# Patient Record
Sex: FEMALE | Race: WHITE
[De-identification: names, ages, dates, MRNs, and addresses within clinical notes are randomized per-mention and may not be internally consistent; named-entity substitution may affect disease eponyms.]

---

## 2021-12-17 LAB
BASOPHILS # (AUTO): 0 K/MM3 (ref 0–0.1)
BASOPHILS NFR BLD AUTO: 0.4 % (ref 0–1.8)
BUN SERPL-MCNC: 17 MG/DL (ref 7–17)
BUN/CREAT SERPL: 34 %
CALCIUM SERPL-MCNC: 9.2 MG/DL (ref 8.4–10.2)
EOSINOPHIL # BLD AUTO: 0.1 K/MM3 (ref 0–0.4)
EOSINOPHIL NFR BLD AUTO: 1.1 % (ref 0–4.3)
HCT VFR BLD CALC: 46.5 % (ref 30.3–42.9)
HEMOLYSIS INDEX: 6
HGB BLD-MCNC: 14.8 GM/DL (ref 10.1–14.3)
LYMPHOCYTES # BLD AUTO: 2.8 K/MM3 (ref 1.2–5.4)
LYMPHOCYTES NFR BLD AUTO: 32.5 % (ref 13.4–35)
MCHC RBC AUTO-ENTMCNC: 32 % (ref 30–34)
MCV RBC AUTO: 86 FL (ref 79–97)
MONOCYTES # (AUTO): 0.6 K/MM3 (ref 0–0.8)
MONOCYTES % (AUTO): 6.7 % (ref 0–7.3)
PLATELET # BLD: 264 K/MM3 (ref 140–440)
RBC # BLD AUTO: 5.43 M/MM3 (ref 3.65–5.03)

## 2021-12-22 NOTE — HISTORY AND PHYSICAL REPORT
History of Present Illness


Date of examination: 21


Chief complaint: 





Menometrorrhagia, fibroids, simple hyperplasia, breast cancer


History of present illness: 


Menstrual History: 


LMP (date): 2021


Current Method of Contraception:  None








Past Pregnancy History 


   :      0








GYN History 


Operations: Breast Biopsy:  (2021)(excision) LEFT BREAST EXCISIONAL BIOPSY


Left Axillary [place] Lymph Node Biopsy (2021) Negative


Abnormal PAP: negative


Uterine Anomaly: positive


 fibroids





Infection History 


HIV Risk Eval: no


Hx of STD: None





Active Medications (reviewed today):


hydrochlorothiazide 12.5 mg capsule (hydrochlorothiazide) 


losartan unspecified unspecified (losartan) 


Vitamin D3 unspecified unspecified (cholecalciferol (vitamin d3)) 





Current Allergies (reviewed today):


CIPRO (Critical)


IBUPROFEN (Critical)


CODEINE (Critical)


LATEX GLOVES (DISPOSABLE GLOVES) (Critical)


ACETAMINOPHEN (ACETAMINOPHEN) (Critical)








Past Medical History:


   Reviewed and updated today:


      PreDiabetes


      Hypertension


      Breast Cancer () BRCA NEGATIVE


      BRCA negative


      Psoriasis





Past Surgical History:


   Reviewed and updated today:


      Breast Biopsy:  (2021)(excision) LEFT BREAST EXCISIONAL BIOPSY


      Left Axillary [place] Lymph Node Biopsy (2021) Negative











Family History Summary: 


   Reviewed history Last on 2021 and no changes required:2021


Other Family Member - Has No Family History of Uterine Cancer - Entered On: 


Other Family Member - Has No Family History of Small Bowel Cancer - Entered On: 

2021


Other Family Member - Has No Family History of Stomach Cancer - Entered On: 

2021


Other Family Member - Has No Family History of Pancreatic Cancer - Entered On: 

2021


Other Family Member - Has No Family History of Ovarvian Cancer - Entered On: 

2021


Other Family Member - Has No Family History of Kidney/Urinary Tract Cancer - 

Entered On: 2021


Other Family Member - Has No Family History of Spontaneous DVT-PE - Entered On: 

2021


Other Family Member - Has No Family History of Colon Cancer - Entered On: 

2021


Other Family Member - Has No Family History of Brain Cancer - Entered On: 

2021


Other Family Member - Has No Family History of Breast Cancer - Entered On: 

2021


Other Family Member - Has No Family History of Biliary Tract Cancer - Entered 

On: 2021








Social History:


   Reviewed history from 2021 and no changes required:


      Patient is 


      


      Smoking History:


      Patient has never smoked.








Risk Factors: 


Smoked Tobacco Use:  Never smoker


Smokeless Tobacco Use:  Never


Passive Smoke Exposure:  no


HIV High Risk Behavior:  no


Exercise:  yes


   Times/wk:  7


   Type of Exercise:  walking


Seatbelt Use:  100 %





PAP Smear History: 


   Date of Last PAP Smear:  2021


   Results:  Normal 





Alcohol Use:  no





Drug Use:  no





Previous Tobacco Use: Signed On - 2021


Smoked Tobacco Use:  Never smoker


Smokeless Tobacco Use:  Never


Passive Smoke Exposure:  no


HIV High Risk Behavior:  no


Exercise:  yes


   Times/wk:  3


   Type of Exercise:  waslking


Seatbelt Use:  100 %


Alcohol Use:  no





Drug Use:  no














Physical Exam 


Appearance: well developed, well nourished, no acute distress





Other Exams 


Lungs: no rales, rhonchi, or wheezes


Heart: S1, S2, no murmur, rub, or gallop





Genitourinary Exam 


Uterus: deferred tp EUA








Impression & Recommendations:





Problem # 1:  Fibroids of  uterus; Intramural (ICD-218.1) (KZQ27-O92.1)


Diagnosis explained to patient . Discussed with patient various medical, 

surgical and radiological therapies common for treatment including, but not 

limited to, myomectomy,  hysterectomy and uterine artery embolization.  

Discussed risks and benefits of laparotomy, laparoscopy, vaginal and robotic 

assisted approaches for hysterectomies. Patient desires definitive treatment in 

the form of robot assisted laparoscopic total hysterectomy.    The risks and 

alternatives for this surgery were reviewed with the patient.  She was informed 

of  the risks of the surgery including, but not limited to, pain, infection, 

bleeding possibly heavy enough to require a blood transfusion with associated 

risks of infections (hepatitis and HIV) and transfusion reactions, possible 

damage to bowel, bladder or ureter(s) and surrounding organs. She was also 

informed of slight increased risk for vaginal cuff  breakdown with the robotic 

approach. Patient understands that this surgery will  make her sterile. 

Indications to abort a robotic/laparoscopic procedure and perform an open 

procedure were explained. Patient understands if her ovaries are removed she 

will become menopausal. Patient advised the small risks of spreading of 

malignancy if morcellation  is required during the surgery patient understands 

and approves performing if necessary. Questions answered. Consent reviewed and 

signed


The patient was instructed/informed the following:


   The normal length of hospital stay for this procedure.


   Nothing to eat or drink after midnight the evening prior to surgery..  


Pre-op instruction sheets given. 


Wound care instructions given.





Problem # 2:  Simple endometrial glandular hyperplasia without atypia (ICD10-

N85.01)








Problem # 3:  Menometrorrhagia (ICD-626.2) (OER03-R35.1)











Problem # 4:  Breast cancer (ICD-174.9) (WBA96-B20.919)


 According to the patient and the note by Dr. Cole (Rad Onc) she will  have 

radiation therapy after she heals from the hysterectomy. She was informed I 

recommend postponing radiation for now to ensure optimal healing of the vaginal 

cuff or other incision(s) however will further discuss after the procedure.


Although she has not had her appt with Dr. Hinson (Med Onc), she states Dr. Wise 

(Breast surgeon)recommends keeping he ovaries at this time. She desires ovarian 

conservation. She was informed she may require surgery later to have her ovaries

removed for a benign or malignant condition.








Problem # 5:  Hypertension (ICD-401.1) ([id number])





Her updated medication list for this problem includes:


   Hydrochlorothiazide 12.5 Mg Capsule (Hydrochlorothiazide)


   Losartan Unspecified Unspecified (Losartan)








Problem # 6:  Prediabetes (AET40-B86.03)


According to her medical clearance 2021, her hgbA1c was 6.7 and she was not 

taking Metformin that was prescribe. 











Medications and Allergies


                                    Allergies











Allergy/AdvReac Type Severity Reaction Status Date / Time


 


ciprofloxacin [From Cipro] Allergy Intermediate Rash Verified 21 11:24


 


latex Allergy Intermediate Rash Verified 21 11:24


 


acetaminophen [From Tylenol] Allergy  Itching Verified 21 12:26


 


ibuprofen 800 mg Allergy Intermediate Rash Uncoded 21 11:24











                                Home Medications











 Medication  Instructions  Recorded  Confirmed  Last Taken  Type


 


Ergocalciferol (Vitamin D2) 50,000 unit PO 1XW 21 Unknown History





[Vitamin D2]     


 


Losartan [Cozaar] 50 mg PO QDAY 21 Unknown History


 


hydroCHLOROthiazide [HCTZ] 25 mg PO PRN 21 Unknown History











Active Meds: 


Active Medications





Celecoxib (Celecoxib 200 Mg Cap)  400 mg PO PREOP NR


   Stop: 21 23:01


Diphenhydramine HCl (Diphenhydramine 50 Mg Cap)  25 mg PO ONCE NR


   Stop: 21 23:01


Fentanyl (Fentanyl 100 Mcg/2 Ml Inj)  100 mcg IV ONCE ONE


   Stop: 21 06:01


Hydrochlorothiazide (Hydrochlorothiazide 25 Mg Tab)  25 mg PO PRN JOSE


Lactated Ringer's (Lactated Ringers)  1,000 mls @ 125 mls/hr IV AS DIRECT JOSE


Cefazolin Sodium (Ancef/Sterile Water 2 Gm/20 Ml)  2 gm in 20 mls @ 80 mls/hr IV

 PREOP NR; Protocol


   Stop: 21 23:59


Losartan Potassium (Losartan 50 Mg Tab)  50 mg PO QDAY JOSE


Magnesium Oxide (Magnesium Oxide 400 Mg Tab)  400 mg PO ONCE ONE


   Stop: 21 06:01


Methocarbamol (Methocarbamol 750 Mg Tab)  1,500 mg PO ONCE JOSE


   Stop: 21 23:01


Midazolam HCl (Midazolam 2 Mg/2 Ml Inj)  2 mg IV PREOP NR


   Stop: 21 23:59











Exam


                                   Vital Signs











Temp Pulse Resp BP Pulse Ox


 


 98.4 F   84   20   139/85   98 


 


 21 10:30  21 10:30  21 10:30  21 10:30  21 10:30














Results





- Labs





                                 21 06:00





                                 21 06:00





Assessment and Plan





- Patient Problems


(1) Fibroids


Status: Chronic   





(2) Menometrorrhagia


Status: Chronic   





(3) Simple endometrial hyperplasia without atypia


Status: Acute   





(4) Breast cancer


Status: Acute   





(5) Hypertension


Status: Chronic   





(6) Pre-diabetes


Status: Chronic

## 2021-12-23 ENCOUNTER — HOSPITAL ENCOUNTER (OUTPATIENT)
Dept: HOSPITAL 5 - OR | Age: 41
Setting detail: OBSERVATION
LOS: 1 days | Discharge: HOME | End: 2021-12-24
Attending: OBSTETRICS & GYNECOLOGY | Admitting: OBSTETRICS & GYNECOLOGY
Payer: COMMERCIAL

## 2021-12-23 DIAGNOSIS — N85.01: ICD-10-CM

## 2021-12-23 DIAGNOSIS — Z20.822: ICD-10-CM

## 2021-12-23 DIAGNOSIS — D21.9: ICD-10-CM

## 2021-12-23 DIAGNOSIS — Z79.899: ICD-10-CM

## 2021-12-23 DIAGNOSIS — I10: ICD-10-CM

## 2021-12-23 DIAGNOSIS — Z98.890: ICD-10-CM

## 2021-12-23 DIAGNOSIS — N92.1: Primary | ICD-10-CM

## 2021-12-23 DIAGNOSIS — E11.9: ICD-10-CM

## 2021-12-23 PROCEDURE — 96366 THER/PROPH/DIAG IV INF ADDON: CPT

## 2021-12-23 PROCEDURE — U0003 INFECTIOUS AGENT DETECTION BY NUCLEIC ACID (DNA OR RNA); SEVERE ACUTE RESPIRATORY SYNDROME CORONAVIRUS 2 (SARS-COV-2) (CORONAVIRUS DISEASE [COVID-19]), AMPLIFIED PROBE TECHNIQUE, MAKING USE OF HIGH THROUGHPUT TECHNOLOGIES AS DESCRIBED BY CMS-2020-01-R: HCPCS

## 2021-12-23 PROCEDURE — 81025 URINE PREGNANCY TEST: CPT

## 2021-12-23 PROCEDURE — 88307 TISSUE EXAM BY PATHOLOGIST: CPT

## 2021-12-23 PROCEDURE — 96365 THER/PROPH/DIAG IV INF INIT: CPT

## 2021-12-23 PROCEDURE — 88302 TISSUE EXAM BY PATHOLOGIST: CPT

## 2021-12-23 PROCEDURE — 83036 HEMOGLOBIN GLYCOSYLATED A1C: CPT

## 2021-12-23 PROCEDURE — 93005 ELECTROCARDIOGRAM TRACING: CPT

## 2021-12-23 PROCEDURE — 85025 COMPLETE CBC W/AUTO DIFF WBC: CPT

## 2021-12-23 PROCEDURE — 86901 BLOOD TYPING SEROLOGIC RH(D): CPT

## 2021-12-23 PROCEDURE — 86900 BLOOD TYPING SEROLOGIC ABO: CPT

## 2021-12-23 PROCEDURE — 36415 COLL VENOUS BLD VENIPUNCTURE: CPT

## 2021-12-23 PROCEDURE — 85018 HEMOGLOBIN: CPT

## 2021-12-23 PROCEDURE — 80048 BASIC METABOLIC PNL TOTAL CA: CPT

## 2021-12-23 PROCEDURE — 86850 RBC ANTIBODY SCREEN: CPT

## 2021-12-23 PROCEDURE — 58554 LAPARO-VAG HYST W/T/O COMPL: CPT

## 2021-12-23 PROCEDURE — 85014 HEMATOCRIT: CPT

## 2021-12-23 PROCEDURE — 64450 NJX AA&/STRD OTHER PN/BRANCH: CPT

## 2021-12-23 PROCEDURE — G0378 HOSPITAL OBSERVATION PER HR: HCPCS

## 2021-12-23 RX ADMIN — CEFAZOLIN SCH MLS/HR: 330 INJECTION, POWDER, FOR SOLUTION INTRAMUSCULAR; INTRAVENOUS at 17:25

## 2021-12-23 NOTE — OPERATIVE REPORT
Operative Report


Operative Report: 


Date:      12/23/2021





Preoperative diagnosis:   1.  Menometrorrhagia


         2.  Simple hyperplasia without atypia


         3.  Body mass index of 36.6 kg/m


         4.  Uterine fibroid


         5.  Breast cancer


         6.  Hypertension


         7.  Prediabetes


         


Postoperative diagnosis:   1.  Menometrorrhagia


         2.  Simple hyperplasia without atypia


         3.  Body mass index of 36.6 kg/m


         4.  Uterine fibroid


         5.  Breast cancer


         6.  Hypertension


         7.  Prediabetes





Procedure:      1.  Robotic-assisted laparoscopic total hysterectomy with bila

teral 


         salpingectomy


         


Surgeon:      Rosy Liu MD





Assistant:         Cheri ARCE





Anesthesiologist:   Dr. Singh





Anesthesia:      General endotracheal anesthesia





EBL:       Approximately 100 mL 





Findings:       EUA: Uterus palpated to approximately 15-16 weeks.    Uterus was

sounded to 11 cm.  Grossly normal tubes and ovaries.  Large posterior fundal 

approximately 10 cm fibroid, enlarged uterus, 3 cm  right lower lateral fibroid.





Procedure:      Patient was taken to the OR and placed in the supine position.  

General anesthesia was induced and an oral gastric tube was placed.  Her neck 

and head were placed on foam support.  Foam eye protection with goggles were 

secured in place.  Then foam face protection was placed and secured.  Foam 

shoulder pads were then positioned on her shoulders for Trendelenburg 

positioning.  She was then placed in dorsolithotomy position.  Exam under 

anesthesia as above.  The abdomen and vagina were then prepped and draped in the

usual sterile fashion.  Timeout was performed.  A Marley catheter was inserted 

into the bladder with drainage of clear yellow urine.  The operative speculum 

was introduced into the vagina and the anterior lip of the cervix was grasped 

with single-toothed tenaculum.  The uterus was sounded to 11 cm.  The cervix was

progressively dilated to allow the large V care uterine manipulator.  The bulb 

of the manipulator was inflated and the speculum and tenaculum were removed.  

The cup of the manipulator was placed around the cervix and the blue occluder of

the manipulator was properly positioned in the vagina and secured.  A laparotomy

sponge that was saturated with a solution of polymyxin and saline was placed in 

the vagina to ensure pneumoperitoneum.  Sterile gloves were placed and attention

was turned to the abdomen.





A 10 mm midline vertical supraumbilical incision was made approximately 10 cm 

superior to the elevated fundus of the uterus.  A 10-12 mm trocar with the 

laparoscope and camera attached was introduced through this incision under 

direct visualization.  The abdomen was insufflated.  No obvious bowel, bladder, 

ureteral, or major vascular injury was noted.  The patient was then placed in 

steep Trendelenburg position and the following trochars were placed under direct

visualization: 8 mm robotic trochars were placed through incisions made in the 

bilateral midclavicular lower abdominal region approximately 10 cm lateral to 

the midline incision, and a 5 mm trocar was placed through an incision made in 

the right lower lateral pelvis.  The 10 mm laparoscope was then replaced by a 5 

mm laparoscope that was placed through the 5 millimeter lateral trocar.  The 12 

mm trocar was then removed and the Greg Esquivel fascial closure device was 

placed through the incision and a 0 Vicryl was placed through the fascia.  Once 

the suture was secured the 12 mm trocar was reintroduced.  Once the trochars 

were in the appropriate positions,  the  da Clif robot system was engaged.  The

vessel sealer and bipolar device were placed through the 8 mm trochars and 

positioned then attention was turned to the console.  The uterus was elevated 

and bilateral salpingectomy was performed.  Each tube was removed through the 5 

mm trocar and sent to pathology in separate containers.  Then the utero-ovarian 

ligaments were clamped, cauterized and incised bilaterally using 30 W of energy.

 Then the round ligaments were clamped, cauterized and incised bilaterally.    

The anterior leaf of the broad ligament was elevated and with careful blunt and 

sharp dissection the bladder flap was created and dissected away from the lower 

uterine segment and cervix.  The posterior leaf of the broad ligament was 

dissected away from the uterine vessels.   The right fibroid was removed in 

order to restore appropriate anatomy.  Once the fibroids were removed the 

uterine vessels were able to better be visualized and skeletonized . The cup of 

the uterine manipulator was palpated both anteriorly and posteriorly.  The 

bladder was further dissected away from the lower uterine segment.  The uterine 

vessels were then clamped and cauterized bilaterally.  Blanching of the uterus 

was then noted.  Attention was again turned to the anterior lower uterine 

segment and the bladder was confirmed to be away from the operative field.  Then

attention was turned again to the posterior where the cup of the manipulator was

palpated. The vessel sealer was removed and the EndoShears were placed and a 

colpotomy was performed down to the cup.  The incision was extended in the 

lateral position to the uterine vessels that were again clamped and cauterized 

and incised.  Continuing along the cup of the manipulator in a circumferential 

manner the colpotomy was completed.  The fibroid that was previously removed was

placed in the vagina for removal.  The uterus appeared to be too large to be 

delivered through the vaginal incision in the head therefore the large posterior

fundal fibroid was removed and bivalved and then removed through the vagina.  

The uterus and cervix were then removed through the vaginal incision.    The 

pelvis was irrigated with warm normal saline.  A moist laparotomy sponge was 

placed in the vagina to maintain pneumoperitoneum.  The vagina cuff was 

approximated using V  suture from the right to left and then reinforced 

in the opposite direction with the same V LOC suture.  Then a J stitch was 

performed to secure the suture.  Again the pelvis was copiously irrigated with 

polymixin in warm normal saline.  The laparotomy sponge was removed from the 

vagina.  No obvious evidence of bowel, bladder, ureteral, or major vascular 

injury was noted. 








Once hemostasis was noted, Surgicel powder was applied to the operative field to

ensure hemostasis.  Approximately 30 seconds after the Surgicel powder was plac

ed, the pelvis was irrigated to remove the excess powder. 





Then the instruments were removed, the robot was disengaged.  The 12 mm trocar 

was removed and the fascia  was ligated with the 0 Vicryl suture that was placed

at the beginning of the procedure.  The patient was taken out of Trendelenburg 

position, the abdomen was desufflated,  the remaining trochars were removed.  

Incisions were reapproximated using 4-0 Monocryl in a subcuticular manner.    

Surgiseal was placed over the other incisions.  The vagina was then inspected, 

the cuff was palpated to be intact, there is a small superficial laceration on 

the left introitus that was approximated for hemostasis with 3-0 Vicryl in a 

figure-of-eight fashion. Clear yellow urine was draining into the Marley bag from

the bladder at the end of the procedure.  Counts were correct 3. Marley catheter

was removed. Patient was taken to recovery room in stable condition.

## 2021-12-24 VITALS — SYSTOLIC BLOOD PRESSURE: 128 MMHG | DIASTOLIC BLOOD PRESSURE: 77 MMHG

## 2021-12-24 LAB
HCT VFR BLD CALC: 40.1 % (ref 30.3–42.9)
HGB BLD-MCNC: 12.9 GM/DL (ref 10.1–14.3)

## 2021-12-24 RX ADMIN — CEFAZOLIN SCH MLS/HR: 330 INJECTION, POWDER, FOR SOLUTION INTRAMUSCULAR; INTRAVENOUS at 00:35

## 2021-12-24 RX ADMIN — GABAPENTIN SCH MG: 300 CAPSULE ORAL at 20:51

## 2021-12-24 RX ADMIN — GABAPENTIN SCH MG: 300 CAPSULE ORAL at 06:41

## 2021-12-24 RX ADMIN — CEFAZOLIN SCH MLS/HR: 330 INJECTION, POWDER, FOR SOLUTION INTRAMUSCULAR; INTRAVENOUS at 01:10

## 2021-12-24 NOTE — EVENT NOTE
Date: 12/23/21





Patient resting in bed, states she was nauseous earlier but none now.Operative 

findings and procedure explained. Informed she was rather lethargic in recovery 

and her BP's were elevated therefore the decision was made to observe her 

overnight. She will be reassessed in the am and ? allowed home. Questions 

encouraged and answered, she voiced understanding and agrees with plan of care.

## 2021-12-24 NOTE — PROGRESS NOTE
Assessment and Plan





- Patient Problems


(1) History of robot-assisted laparoscopic hysterectomy


Current Visit: Yes   Status: Acute   


Plan to address problem: 


POD#1 Doing well, no complaints. Labs reviewed. Tachycardia noted uncertain 

etiology. She denies pain however given Gabapentin. She denies SOB and CP. Will 

observe for now. Allow home if tachycardia resolves. Post-op instrns given to 

patient. She voiced understanding and agrees with POC.








(2) Fibroids


Current Visit: No   Status: Resolved   





(3) Menometrorrhagia


Current Visit: No   Status: Resolved   





(4) Simple endometrial hyperplasia without atypia


Current Visit: No   Status: Resolved   





(5) Breast cancer


Current Visit: No   Status: Acute   





(6) Hypertension


Current Visit: No   Status: Chronic   





(7) Pre-diabetes


Current Visit: No   Status: Chronic   





(8) Tachycardia


Current Visit: Yes   Status: Acute   





Subjective





- Subjective


Date of service: 12/24/21


Principal diagnosis: POD#1 RALTH


Interval history: 





Resting in bed, no complaints. 


Patient reports: appetite normal, voiding normally (Patient actually took 

pictures of each time she voided. Appears to have voided ~every hour ~300-400mL 

each time clear yellow), ambulating normally (No gonzalez overnight, she was able 

to ambulate to and from the bathroom w/o difficulty or assistance)





Objective





- Vital Signs


Latest vital signs: 


                                   Vital Signs











  Temp Pulse Resp BP BP Pulse Ox


 


 12/24/21 04:30  98.6 F  110 H  18   138/61  99


 


 12/24/21 00:33  98.5 F  110 H  18  135/86   97


 


 12/23/21 20:35  98.2 F  107 H  18   130/83  95


 


 12/23/21 19:50       96


 


 12/23/21 17:40  97.6 F  103 H  18   148/87  96


 


 12/23/21 16:20  98.6 F  98 H  16   145/94  96


 


 12/23/21 16:00   94 H  14  140/90   99


 


 12/23/21 15:45   90  16  157/93   100


 


 12/23/21 15:30   89  15  159/100   99


 


 12/23/21 15:15  97.6 F  93 H  13  160/98   99


 


 12/23/21 15:00   91 H  12  167/101   100


 


 12/23/21 14:55   89  11 L  157/98   100


 


 12/23/21 14:50   86  11 L  162/101   100


 


 12/23/21 14:46  97.3 F L  105 H  14  185/105   96


 


 12/23/21 09:40   83  14  158/98   100


 


 12/23/21 09:30   82  16  143/92   100


 


 12/23/21 09:24    14   


 


 12/23/21 09:20   86  13  142/95   100


 


 12/23/21 09:06   82  13  133/94   99


 


 12/23/21 09:01   80  12  139/91   98


 


 12/23/21 08:56   80  12  140/96   98


 


 12/23/21 08:51   84  14  130/97   100


 


 12/23/21 08:43   81  13  133/84   100


 


 12/23/21 08:15    16   


 


 12/23/21 07:35  97.7 F  86  16  151/89   98








                                Intake and Output











 12/23/21 12/24/21 12/24/21





 22:59 06:59 14:59


 


Intake Total 2770 245 


 


Output Total 225 400 


 


Balance 2545 -155 


 


Intake:   


 


  IV 2650 5 


 


    ANCEF/NS 1 GM/50 ML 1 gm 50 5 





    In 50 ml @ 100 mls/hr IV   





    Q8H [facility] Rx#:[id number]   


 


  Oral  240 


 


  Intake, Free Water 120  


 


Output:   


 


  Urine 225 400 


 


    Void 225 400 


 


Other:   


 


  Total, Intake Amount  240 


 


  Total, Output Amount 150 400 














- Exam


Breasts: Present: deferred


Cardiovascular: Present: Other (tachycardia ~100)


Lungs: Present: Clear to auscultation, Normal air movement


Abdomen: Present: normal appearance, soft, normal bowel sounds.  Absent: 

distention, tenderness


Extremities: Present: normal.  Absent: tenderness, edema


Incision: Present: normal, dry, intact (C/D/I)





- Labs


Labs: 


                              Abnormal lab results











  12/24/21 Range/Units





  04:41 


 


Hemoglobin A1c  6.7 H  (4-6)  %

## 2021-12-24 NOTE — DISCHARGE SUMMARY
Providers





- Providers


Date of Admission: 


12/23/21 15:33





Date of discharge: 12/24/21


Attending physician: 


AMEENA TAPIA





                                        





12/24/21 15:36


Consult to Physician [CONS] Routine 


   Comment: 


   Consulting Provider: KENRICK ROMANO


   Physician Instructions: 


   Reason For Exam: Abnomal EKG, post op











Primary care physician: 


MIGUEL DE LEON MD








Hospitalization


Condition: Good


Procedures: 





Robotic assisted laparoscopic total hysterectomy with bilateral salpingectomy


Hospital course: 





Ms. Hall was admitted for robotic assisted laparoscopic total hysterectomy 

with bilateral salpingectomy.  Her procedure was uncomplicated however she was 

slow with waking up and her pressures were elevated in recovery therefore the 

decision was made to admit her overnight and observe.  During the night it was 

noted patient's heart rate slowly increased in spite of the fact that she was 

voiding without difficulty and copious clear yellow urine.  Her hemoglobin was 

12.9.  There is no evidence of bleeding.  Due to the persistent mild tachycardia

 EKG was performed that was abnormal.  Dr. Anil Brenner was consulted, she re

viewed the EKG and verbally stated that the patient did not require any further 

evaluation and could be discharged home.  I spoke to Ms. Hall by phone, 

questions were encouraged and answered and the plan of care was explained.  She 

voiced understanding desires discharge home tonight.  Discharge instructions 

were reviewed with her earlier today.  Patient will be allowed home at this 

time.


Disposition: 01 HOME / SELF CARE / HOMELESS


Final Discharge Diagnosis (Prints w/discharge instructions): Robotic assisted 

laparoscopic total hysterectomy with bilateral salpingectomy.





- Discharge Diagnoses


(1) History of robot-assisted laparoscopic hysterectomy


Status: Acute   





(2) Fibroids


Status: Resolved   





(3) Menometrorrhagia


Status: Resolved   





(4) Simple endometrial hyperplasia without atypia


Status: Resolved   





(5) Breast cancer


Status: Acute   





(6) Hypertension


Status: Chronic   





(7) Pre-diabetes


Status: Chronic   





(8) Tachycardia


Status: Acute   





Core Measure Documentation





- Palliative Care


Palliative Care/ Comfort Measures: Not Applicable





- Core Measures


Any of the following diagnoses?: none





Exam





- Physical Exam


Narrative exam: 


See post op note for physical exam.





- Constitutional


Vitals: 


                                        











Temp Pulse Resp BP Pulse Ox


 


 97.7 F   98 H  16   130/79   98 


 


 12/24/21 16:15  12/24/21 16:15  12/24/21 16:15  12/24/21 16:15  12/24/21 16:15














Plan


Activity: other (No sex, no driving.  Ambulate approximately 1 mile on her 

property a day.  Use your incentive spirometer every hour while awake.  Void 

every hour while awake.)


Weight Bearing Status: Weight Bear as Tolerated


Diet: low fat, low cholesterol, low salt, diabetic (Eat small meals frequently. 

 Avoid spicy foods.  Drink approximately 80 ounces of water a day.)


Wound: open to air, keep clean and dry


Special Instructions: no heavy lifting (Greater than 15 pounds.)


Follow up with: 


MIGUEL DE LEON MD [Primary Care Provider] - 3 Days


AMEENA TAPIA MD [Staff Physician] -  (As scheduled)


DEACON BRENNER MD [Staff Physician] - 3 Days


Prescriptions: 


Gabapentin 300 mg PO Q8HR PRN #15 capsule


 PRN Reason: Pain , Severe (7-10)

## 2021-12-27 NOTE — ELECTROCARDIOGRAPH REPORT
[facility]

                                       

Test Date:    2021               Test Time:    15:18:45

Pat Name:     KAREN LEARY             Department:   

Patient ID:   [id number]          Room:         2104 1

Gender:       F                        Technician:   GRACE

:          1980               Requested By: AMEENA TAPIA

Order Number: [id number]              Reading MD:   Dexter Carver

                                 Measurements

Intervals                              Axis          

Rate:         101                      P:            -45

MD:           186                      QRS:          48

QRSD:         83                       T:            3

QT:           359                                    

QTc:          466                                    

                           Interpretive Statements

Very poor quality ECG

SINUS TACHYCARDIA

No previous ECG available for comparison

Electronically Signed On 2021 10:41:33 EST by Dexter Carver